# Patient Record
Sex: FEMALE | Race: BLACK OR AFRICAN AMERICAN | NOT HISPANIC OR LATINO | URBAN - METROPOLITAN AREA
[De-identification: names, ages, dates, MRNs, and addresses within clinical notes are randomized per-mention and may not be internally consistent; named-entity substitution may affect disease eponyms.]

---

## 2018-06-11 ENCOUNTER — OUTPATIENT (OUTPATIENT)
Dept: OUTPATIENT SERVICES | Facility: HOSPITAL | Age: 33
LOS: 1 days | End: 2018-06-11
Payer: MEDICAID

## 2018-06-11 ENCOUNTER — APPOINTMENT (OUTPATIENT)
Dept: SURGERY | Facility: CLINIC | Age: 33
End: 2018-06-11

## 2018-06-11 DIAGNOSIS — Z01.818 ENCOUNTER FOR OTHER PREPROCEDURAL EXAMINATION: ICD-10-CM

## 2018-06-11 DIAGNOSIS — S83.251S: ICD-10-CM

## 2018-06-11 LAB
ALBUMIN SERPL ELPH-MCNC: 4.5 G/DL — SIGNIFICANT CHANGE UP (ref 3.3–5)
ALP SERPL-CCNC: 66 U/L — SIGNIFICANT CHANGE UP (ref 40–120)
ALT FLD-CCNC: 12 U/L — SIGNIFICANT CHANGE UP (ref 10–45)
ANION GAP SERPL CALC-SCNC: 12 MMOL/L — SIGNIFICANT CHANGE UP (ref 5–17)
APTT BLD: 32.1 SEC — SIGNIFICANT CHANGE UP (ref 27.5–37.4)
AST SERPL-CCNC: 15 U/L — SIGNIFICANT CHANGE UP (ref 10–40)
BILIRUB SERPL-MCNC: 0.2 MG/DL — SIGNIFICANT CHANGE UP (ref 0.2–1.2)
BUN SERPL-MCNC: 10 MG/DL — SIGNIFICANT CHANGE UP (ref 7–23)
CALCIUM SERPL-MCNC: 9.8 MG/DL — SIGNIFICANT CHANGE UP (ref 8.4–10.5)
CHLORIDE SERPL-SCNC: 98 MMOL/L — SIGNIFICANT CHANGE UP (ref 96–108)
CO2 SERPL-SCNC: 26 MMOL/L — SIGNIFICANT CHANGE UP (ref 22–31)
CREAT SERPL-MCNC: 0.6 MG/DL — SIGNIFICANT CHANGE UP (ref 0.5–1.3)
GLUCOSE SERPL-MCNC: 88 MG/DL — SIGNIFICANT CHANGE UP (ref 70–99)
HCT VFR BLD CALC: 35.2 % — SIGNIFICANT CHANGE UP (ref 34.5–45)
HGB BLD-MCNC: 10.8 G/DL — LOW (ref 11.5–15.5)
INR BLD: 1.07 — SIGNIFICANT CHANGE UP (ref 0.88–1.16)
MCHC RBC-ENTMCNC: 22.5 PG — LOW (ref 27–34)
MCHC RBC-ENTMCNC: 30.7 G/DL — LOW (ref 32–36)
MCV RBC AUTO: 73.3 FL — LOW (ref 80–100)
PLATELET # BLD AUTO: 312 K/UL — SIGNIFICANT CHANGE UP (ref 150–400)
POTASSIUM SERPL-MCNC: 4.1 MMOL/L — SIGNIFICANT CHANGE UP (ref 3.5–5.3)
POTASSIUM SERPL-SCNC: 4.1 MMOL/L — SIGNIFICANT CHANGE UP (ref 3.5–5.3)
PROT SERPL-MCNC: 7.7 G/DL — SIGNIFICANT CHANGE UP (ref 6–8.3)
PROTHROM AB SERPL-ACNC: 11.9 SEC — SIGNIFICANT CHANGE UP (ref 9.8–12.7)
RBC # BLD: 4.8 M/UL — SIGNIFICANT CHANGE UP (ref 3.8–5.2)
RBC # FLD: 16.2 % — SIGNIFICANT CHANGE UP (ref 10.3–16.9)
SODIUM SERPL-SCNC: 136 MMOL/L — SIGNIFICANT CHANGE UP (ref 135–145)
WBC # BLD: 4.7 K/UL — SIGNIFICANT CHANGE UP (ref 3.8–10.5)
WBC # FLD AUTO: 4.7 K/UL — SIGNIFICANT CHANGE UP (ref 3.8–10.5)

## 2018-06-11 PROCEDURE — 93010 ELECTROCARDIOGRAM REPORT: CPT

## 2018-06-15 ENCOUNTER — OUTPATIENT (OUTPATIENT)
Dept: OUTPATIENT SERVICES | Facility: HOSPITAL | Age: 33
LOS: 1 days | Discharge: HOME | End: 2018-06-15

## 2019-05-15 ENCOUNTER — APPOINTMENT (OUTPATIENT)
Dept: ENDOCRINOLOGY | Facility: CLINIC | Age: 34
End: 2019-05-15
Payer: MEDICAID

## 2019-05-15 VITALS
SYSTOLIC BLOOD PRESSURE: 130 MMHG | BODY MASS INDEX: 33.27 KG/M2 | DIASTOLIC BLOOD PRESSURE: 75 MMHG | WEIGHT: 212 LBS | HEART RATE: 90 BPM

## 2019-05-15 DIAGNOSIS — E28.2 POLYCYSTIC OVARIAN SYNDROME: ICD-10-CM

## 2019-05-15 PROCEDURE — 99215 OFFICE O/P EST HI 40 MIN: CPT

## 2019-05-15 NOTE — HISTORY OF PRESENT ILLNESS
[FreeTextEntry1] : last here over 3 years ago\par diagnosed with PCOS in November after periods were irregular last year. (would miss one period every 3 months)\par had PCO appearance on pelvic sono\par menarche in 8th grade and periods were always regular. had no problems with pregnancy, no GDM (has two children, younger one is 6)\par also having more hirsutism and acne. no hair loss\par weight has been the same (did not gain last year)\par started keto diet one month ago, lost 10 lb. \par LMP 4/29/19\par no neck symptoms.  neck feels the same to her. no dysphagia or change in voice.\par

## 2019-05-15 NOTE — PHYSICAL EXAM
[Alert] : alert [Normal Voice/Communication] : normal voice communication [Healthy Appearance] : healthy appearance [No Proptosis] : no proptosis [No Lid Lag] : no lid lag [Normal Hearing] : hearing was normal [Clear to Auscultation] : lungs were clear to auscultation bilaterally [No LAD] : no lymphadenopathy [Normal S1, S2] : normal S1 and S2 [Regular Rhythm] : with a regular rhythm [No Stigmata of Cushings Syndrome] : no stigmata of cushings syndrome [Normal Insight/Judgement] : insight and judgment were intact [Normal Affect] : the affect was normal [Normal Mood] : the mood was normal [de-identified] : left nodule faintly palpable (fullness) medially [de-identified] : marked acanthosis nigricans [Abdominal Striae] : no abdominal striae

## 2019-05-15 NOTE — ASSESSMENT
[FreeTextEntry1] : PCOS, obesity, BMI 33. Explained that treatment of PCOS is based on symptoms, and treatment options reviewed (oral contraceptive, spironolactone, metformin).  Discussed increased risks of infertility, gestational diabetes, and future diabetes with PCOS. will start her on spironolactone and metformin (she doesn't think she can remember to take estrogen/progestin pill at same time every day).  Will try metformin 500mg/day, and increase to BID after 2-3 weeks if not having GI side effects.  Explained that metformin does not cause low blood sugar, but should skip dose if planning to have more than 1 alcoholic drink.  Start spironolactone 50mg/day and can increase to 100mg/day after one month, if not having side effects (polyuria, polydipsia, dizziness, low BP, tachycardia, palpitations). \par  keto  diet can be hard to maintain.  would transition to low carb (instead of no carb) diet, which is what she is planning to do.  exercise goal 30 min/day\par \par Thyroid nodule, will send for thryoid sono.  will send for repeat  FNA biopsy if nodule  showing suspicious features (microcalcifications, irregular borders, tall > wide, hypervascularity) or significant increase in size (over 20% increase). Otherwise, can continue monitoring TSH and sonogram once a year.\par  \par RTO 4 months

## 2019-05-15 NOTE — DATA REVIEWED
[FreeTextEntry1] : Thyroid FNA, left 1.6 x 1.1 x 1.6cm nodule, 10/2014: benign follicular nodule, Thyroseq negative (no mutations).\par sonogram, 7/15: homogenous with left nodule, 1.5cm x 1.0 cm

## 2019-05-17 LAB
ALBUMIN SERPL ELPH-MCNC: 4.7 G/DL
ALP BLD-CCNC: 64 U/L
ALT SERPL-CCNC: 14 U/L
ANION GAP SERPL CALC-SCNC: 14 MMOL/L
AST SERPL-CCNC: 17 U/L
BILIRUB SERPL-MCNC: 0.2 MG/DL
BUN SERPL-MCNC: 8 MG/DL
CALCIUM SERPL-MCNC: 9.7 MG/DL
CHLORIDE SERPL-SCNC: 100 MMOL/L
CHOLEST SERPL-MCNC: 184 MG/DL
CHOLEST/HDLC SERPL: 5.8 RATIO
CO2 SERPL-SCNC: 23 MMOL/L
CREAT SERPL-MCNC: 0.59 MG/DL
ESTIMATED AVERAGE GLUCOSE: 108 MG/DL
FSH SERPL-MCNC: 6.5 IU/L
GLUCOSE SERPL-MCNC: 83 MG/DL
HBA1C MFR BLD HPLC: 5.4 %
HDLC SERPL-MCNC: 32 MG/DL
LDLC SERPL CALC-MCNC: 109 MG/DL
LH SERPL-ACNC: 19.2 IU/L
POTASSIUM SERPL-SCNC: 4.3 MMOL/L
PROT SERPL-MCNC: 7.3 G/DL
SODIUM SERPL-SCNC: 137 MMOL/L
TESTOST SERPL-MCNC: 36.7 NG/DL
TRIGL SERPL-MCNC: 216 MG/DL
TSH SERPL-ACNC: 0.54 UIU/ML

## 2019-05-19 DIAGNOSIS — R73.03 PREDIABETES.: ICD-10-CM

## 2019-08-20 PROBLEM — R73.03 PREDIABETES: Status: ACTIVE | Noted: 2019-08-20

## 2019-09-18 ENCOUNTER — APPOINTMENT (OUTPATIENT)
Dept: ENDOCRINOLOGY | Facility: CLINIC | Age: 34
End: 2019-09-18

## 2021-05-05 ENCOUNTER — APPOINTMENT (OUTPATIENT)
Dept: ENDOCRINOLOGY | Facility: CLINIC | Age: 36
End: 2021-05-05
Payer: MEDICAID

## 2021-05-05 VITALS
HEART RATE: 79 BPM | SYSTOLIC BLOOD PRESSURE: 132 MMHG | BODY MASS INDEX: 38.14 KG/M2 | DIASTOLIC BLOOD PRESSURE: 79 MMHG | WEIGHT: 243 LBS

## 2021-05-05 PROCEDURE — 99213 OFFICE O/P EST LOW 20 MIN: CPT

## 2021-05-05 RX ORDER — SPIRONOLACTONE 50 MG/1
50 TABLET ORAL DAILY
Qty: 30 | Refills: 5 | Status: ACTIVE | COMMUNITY
Start: 2019-05-15 | End: 1900-01-01

## 2021-05-06 RX ORDER — METFORMIN HYDROCHLORIDE 500 MG/1
500 TABLET, COATED ORAL TWICE DAILY
Qty: 60 | Refills: 5 | Status: DISCONTINUED | COMMUNITY
Start: 2019-05-15 | End: 2021-05-06

## 2021-05-06 RX ORDER — NORGESTIMATE AND ETHINYL ESTRADIOL 7DAYSX3 28
0.18/0.215/0.25 KIT ORAL
Refills: 0 | Status: ACTIVE | COMMUNITY

## 2021-05-06 NOTE — ASSESSMENT
[FreeTextEntry1] : PCOS, obesity, BMI 38. \par Titrate metformin to 850mg bid and add spironolactone 50mg/day. \par continue diet and exercise efforts.\par \par Thyroid nodule, will send for thyroid sono.  will send for repeat  FNA biopsy if nodule  showing suspicious features (microcalcifications, irregular borders, tall > wide, hypervascularity) or significant increase in size (over 20% increase). Otherwise, can continue monitoring TSH and sonogram once a year.\par  \par RTO 1 year

## 2021-05-06 NOTE — PHYSICAL EXAM
[Alert] : alert [No Acute Distress] : no acute distress [No Proptosis] : no proptosis [No Lid Lag] : no lid lag [Normal Hearing] : hearing was normal [No LAD] : no lymphadenopathy [Clear to Auscultation] : lungs were clear to auscultation bilaterally [Normal S1, S2] : normal S1 and S2 [Regular Rhythm] : with a regular rhythm [Normal Affect] : the affect was normal [Normal Mood] : the mood was normal [de-identified] : L sided fullness in thyroid [de-identified] : moderate acanthosis nigricans

## 2021-05-06 NOTE — HISTORY OF PRESENT ILLNESS
[FreeTextEntry1] : last here May 2019\par still on metformin. \par was rx'd estrogen/progestin pill by GYN (ortho Tri cyclen generic)  in November.  In the beginning she was mccann and had acne but now says she is accustomed to it.  She still gets acne before her period.\par Exercising 3x/week the past two months, trying to get her weight down.  She was less active during the pandemic.\par not eating after 6pm.\par not planning pregnancy (has three children already, youngest is 8 years old)\par not vaccinated yet\par \par Meds\par metformin 500mg bid\par

## 2021-05-06 NOTE — DATA REVIEWED
[FreeTextEntry1] : 5/19: LH 19.2, FSH 6.5 , tot T 36.7, TSH 0.54, tot chol 184, trig 216, HDL 32, \par \par thyroid sono, 5/19: heterogeneous\par L mid nodule 15 x 9x 18mm, hypoechoic \par \par Thyroid FNA, left 1.6 x 1.1 x 1.6cm nodule, 10/2014: benign follicular nodule, Thyroseq negative (no mutations).\par sonogram, 7/15: homogenous with left nodule, 1.5cm x 1.0 cm

## 2021-05-19 ENCOUNTER — TRANSCRIPTION ENCOUNTER (OUTPATIENT)
Age: 36
End: 2021-05-19

## 2021-08-05 ENCOUNTER — NON-APPOINTMENT (OUTPATIENT)
Age: 36
End: 2021-08-05

## 2022-02-06 ENCOUNTER — NON-APPOINTMENT (OUTPATIENT)
Age: 37
End: 2022-02-06

## 2022-05-06 ENCOUNTER — APPOINTMENT (OUTPATIENT)
Dept: ENDOCRINOLOGY | Facility: CLINIC | Age: 37
End: 2022-05-06